# Patient Record
Sex: MALE | Race: AMERICAN INDIAN OR ALASKA NATIVE
[De-identification: names, ages, dates, MRNs, and addresses within clinical notes are randomized per-mention and may not be internally consistent; named-entity substitution may affect disease eponyms.]

---

## 2018-02-19 ENCOUNTER — HOSPITAL ENCOUNTER (INPATIENT)
Dept: HOSPITAL 5 - ED | Age: 48
LOS: 4 days | Discharge: HOME | DRG: 65 | End: 2018-02-23
Attending: INTERNAL MEDICINE | Admitting: INTERNAL MEDICINE
Payer: COMMERCIAL

## 2018-02-19 DIAGNOSIS — I63.9: Primary | ICD-10-CM

## 2018-02-19 DIAGNOSIS — Z82.49: ICD-10-CM

## 2018-02-19 DIAGNOSIS — Z83.3: ICD-10-CM

## 2018-02-19 DIAGNOSIS — J45.909: ICD-10-CM

## 2018-02-19 DIAGNOSIS — R53.1: ICD-10-CM

## 2018-02-19 DIAGNOSIS — I10: ICD-10-CM

## 2018-02-19 DIAGNOSIS — G81.94: ICD-10-CM

## 2018-02-19 DIAGNOSIS — E11.9: ICD-10-CM

## 2018-02-19 DIAGNOSIS — G20: ICD-10-CM

## 2018-02-19 DIAGNOSIS — Z91.013: ICD-10-CM

## 2018-02-19 DIAGNOSIS — E66.01: ICD-10-CM

## 2018-02-19 DIAGNOSIS — Z71.89: ICD-10-CM

## 2018-02-19 LAB
APTT BLD: 31.5 SEC. (ref 24.2–36.6)
BASOPHILS # (AUTO): 0.1 K/MM3 (ref 0–0.1)
BASOPHILS NFR BLD AUTO: 0.9 % (ref 0–1.8)
BILIRUB UR QL STRIP: (no result)
BLOOD UR QL VISUAL: (no result)
BUN SERPL-MCNC: 14 MG/DL (ref 9–20)
BUN/CREAT SERPL: 16 %
CALCIUM SERPL-MCNC: 9.7 MG/DL (ref 8.4–10.2)
EOSINOPHIL # BLD AUTO: 0 K/MM3 (ref 0–0.4)
EOSINOPHIL NFR BLD AUTO: 0.5 % (ref 0–4.3)
HCT VFR BLD CALC: 46 % (ref 35.5–45.6)
HEMOLYSIS INDEX: 12
HGB BLD-MCNC: 15.5 GM/DL (ref 11.8–15.2)
INR PPP: 0.93 (ref 0.87–1.13)
LYMPHOCYTES # BLD AUTO: 1.4 K/MM3 (ref 1.2–5.4)
LYMPHOCYTES NFR BLD AUTO: 24.1 % (ref 13.4–35)
MCH RBC QN AUTO: 28 PG (ref 28–32)
MCHC RBC AUTO-ENTMCNC: 34 % (ref 32–34)
MCV RBC AUTO: 82 FL (ref 84–94)
MONOCYTES # (AUTO): 0.4 K/MM3 (ref 0–0.8)
MONOCYTES % (AUTO): 7.4 % (ref 0–7.3)
MUCOUS THREADS #/AREA URNS HPF: (no result) /HPF
PH UR STRIP: 8 [PH] (ref 5–7)
PLATELET # BLD: 233 K/MM3 (ref 140–440)
RBC # BLD AUTO: 5.62 M/MM3 (ref 3.65–5.03)
RBC #/AREA URNS HPF: 1 /HPF (ref 0–6)
UROBILINOGEN UR-MCNC: < 2 MG/DL (ref ?–2)
WBC #/AREA URNS HPF: 1 /HPF (ref 0–6)

## 2018-02-19 PROCEDURE — 83036 HEMOGLOBIN GLYCOSYLATED A1C: CPT

## 2018-02-19 PROCEDURE — 93325 DOPPLER ECHO COLOR FLOW MAPG: CPT

## 2018-02-19 PROCEDURE — 82962 GLUCOSE BLOOD TEST: CPT

## 2018-02-19 PROCEDURE — 80061 LIPID PANEL: CPT

## 2018-02-19 PROCEDURE — 84439 ASSAY OF FREE THYROXINE: CPT

## 2018-02-19 PROCEDURE — 36415 COLL VENOUS BLD VENIPUNCTURE: CPT

## 2018-02-19 PROCEDURE — 81001 URINALYSIS AUTO W/SCOPE: CPT

## 2018-02-19 PROCEDURE — 93010 ELECTROCARDIOGRAM REPORT: CPT

## 2018-02-19 PROCEDURE — 82652 VIT D 1 25-DIHYDROXY: CPT

## 2018-02-19 PROCEDURE — 71045 X-RAY EXAM CHEST 1 VIEW: CPT

## 2018-02-19 PROCEDURE — 80053 COMPREHEN METABOLIC PANEL: CPT

## 2018-02-19 PROCEDURE — 93312 ECHO TRANSESOPHAGEAL: CPT

## 2018-02-19 PROCEDURE — 93880 EXTRACRANIAL BILAT STUDY: CPT

## 2018-02-19 PROCEDURE — 84484 ASSAY OF TROPONIN QUANT: CPT

## 2018-02-19 PROCEDURE — 85670 THROMBIN TIME PLASMA: CPT

## 2018-02-19 PROCEDURE — 70544 MR ANGIOGRAPHY HEAD W/O DYE: CPT

## 2018-02-19 PROCEDURE — 80048 BASIC METABOLIC PNL TOTAL CA: CPT

## 2018-02-19 PROCEDURE — 85730 THROMBOPLASTIN TIME PARTIAL: CPT

## 2018-02-19 PROCEDURE — 85610 PROTHROMBIN TIME: CPT

## 2018-02-19 PROCEDURE — 84443 ASSAY THYROID STIM HORMONE: CPT

## 2018-02-19 PROCEDURE — 70551 MRI BRAIN STEM W/O DYE: CPT

## 2018-02-19 PROCEDURE — 70450 CT HEAD/BRAIN W/O DYE: CPT

## 2018-02-19 PROCEDURE — 93320 DOPPLER ECHO COMPLETE: CPT

## 2018-02-19 PROCEDURE — 93005 ELECTROCARDIOGRAM TRACING: CPT

## 2018-02-19 PROCEDURE — 85025 COMPLETE CBC W/AUTO DIFF WBC: CPT

## 2018-02-19 PROCEDURE — 82607 VITAMIN B-12: CPT

## 2018-02-19 RX ADMIN — HEPARIN SODIUM SCH UNIT: 5000 INJECTION, SOLUTION INTRAVENOUS; SUBCUTANEOUS at 22:00

## 2018-02-19 RX ADMIN — FAMOTIDINE SCH MG: 10 INJECTION, SOLUTION INTRAVENOUS at 21:27

## 2018-02-19 RX ADMIN — HYDRALAZINE HYDROCHLORIDE SCH MG: 25 TABLET, FILM COATED ORAL at 21:26

## 2018-02-19 RX ADMIN — INSULIN ASPART SCH UNITS: 100 INJECTION, SOLUTION INTRAVENOUS; SUBCUTANEOUS at 22:00

## 2018-02-19 NOTE — XRAY REPORT
AP CHEST:



HISTORY: Cough



AP view of the chest demonstrates a normal mediastinal and

cardiac contour with clear lungs and normal bony and soft tissue

structures.



IMPRESSION:

Unremarkable AP chest. No significant change since 10/29/16.

## 2018-02-19 NOTE — HISTORY AND PHYSICAL REPORT
History of Present Illness


Date of examination: 02/19/18


Date of admission: 


02/19/18 11:57





Chief complaint: 


Chief complaint: Left-sided weakness since morning





History of present illness: 


 History of Present Illness





48-year-old  male with history of hypertension type 2 diabetes and 

hyperlipidemia comes in for left upper and left lower extremity weakness and 

left facial droop and slurred speech.  Patient apparently woke up with these 

symptoms this morning.  His symptoms have been improving.  No syncope no 

seizures.  Patient also has urgency of urine.  No fever no chills.  No 

irregular heart.  No recent travel.  No nausea no vomiting.








 Past Medical History


Previous Medical History?: Yes


Hx Hypertension: Yes


Hx Diabetes: Yes


Hx Asthma: Yes


Additional medical history: martel parkinson syndrome.





Surgical History 


Past Surgical History?: No





-Social History


Smoking Status: Never Smoker


Substance Use Type: None





 Family history:


Hypertension











 Medications   


Home Medications: 


 Home Medications











 Medication  Instructions  Recorded  Confirmed  Last Taken  Type


 


Losartan/Hydrochlorothiazide 1 each PO QDAY 09/10/13 09/11/13 09/30/13 History





[Hyzaar 50-12.5]     


 


amLODIPine [Norvasc] 10 mg PO DAILY 09/10/13 09/11/13 09/30/13 History


 


metFORMIN [Glucophage] 500 mg PO BID 09/10/13 09/11/13 09/30/13 History


 


Carvedilol [Coreg]  10/30/16  Unknown History


 


Ondansetron [Zofran TAB] 4 mg PO Q8HR PRN #14 tablet 10/30/16  Unknown Rx


 


hydrALAZINE [Apresoline TAB]  10/30/16  Unknown History














Review of systems





ROS: 


Stated complaint: LEFT SIDE WEAKNESS


Other details as noted in HPI





Comment: All other systems reviewed and negative


Constitutional: denies: chills, fever


Respiratory: cough.  denies: orthopnea, shortness of breath, SOB with exertion


Cardiovascular: denies: chest pain, palpitations, dyspnea on exertion


Gastrointestinal: denies: abdominal pain, nausea, vomiting, diarrhea, 

constipation, hematemesis, hematochezia


Genitourinary: frequency.  denies: urgency














Medications and Allergies


 Allergies











Allergy/AdvReac Type Severity Reaction Status Date / Time


 


shellfish derived Allergy  Vomiting Verified 09/10/13 22:57











 Home Medications











 Medication  Instructions  Recorded  Confirmed  Last Taken  Type


 


Losartan/Hydrochlorothiazide 1 each PO QDAY 09/10/13 02/19/18 02/19/18 History





[Hyzaar 50-12.5]     


 


amLODIPine [Norvasc] 10 mg PO DAILY 09/10/13 02/19/18 02/19/18 History


 


metFORMIN [Glucophage] 500 mg PO BID 09/10/13 02/19/18 02/19/18 History


 


glipiZIDE [Glipizide] 5 mg PO BID 02/19/18 02/19/18 02/19/18 History


 


hydrALAZINE [Apresoline] 25 mg PO Q8HR 02/19/18 02/19/18 02/19/18 History














Exam





- Physical Exam


Narrative exam: 





Lying in bed comfortably





- Constitutional


Vitals: 


 











Temp Pulse Resp BP Pulse Ox


 


 98.9 F   79   20   186/110   96 


 


 02/19/18 17:28  02/19/18 17:28  02/19/18 17:28  02/19/18 17:28  02/19/18 17:28











General appearance: Present: no acute distress, well-nourished





- EENT


Eyes: Present: PERRL


ENT: hearing intact, clear oral mucosa





- Neck


Neck: Present: supple, normal ROM





- Respiratory


Respiratory effort: normal


Respiratory: bilateral: CTA





- Cardiovascular


Heart rate: 86


Rhythm: regular


Heart Sounds: Present: S1 & S2.  Absent: rub, click





- Extremities


Extremities: pulses symmetrical, No edema


Peripheral Pulses: within normal limits





- Abdominal


General gastrointestinal: Present: soft, non-tender, non-distended, normal 

bowel sounds


Male genitourinary: Present: normal





- Integumentary


Integumentary: Present: clear, warm, dry





- Musculoskeletal


Musculoskeletal: left sided weakness (left side 4/5 power in both upper and 

lower extremities.  Slight left facial droop.)





- Psychiatric


Psychiatric: appropriate mood/affect, intact judgment & insight





- Neurologic


Neurologic: CNII-XII intact, moves all extremities





- Allied Health


Allied health notes reviewed: nursing, case management





Results





- Labs


CBC & Chem 7: 


 02/19/18 09:43





 02/19/18 09:43


Labs: 


 Laboratory Last Values











WBC  5.9 K/mm3 (4.5-11.0)   02/19/18  09:43    


 


RBC  5.62 M/mm3 (3.65-5.03)  H  02/19/18  09:43    


 


Hgb  15.5 gm/dl (11.8-15.2)  H  02/19/18  09:43    


 


Hct  46.0 % (35.5-45.6)  H  02/19/18  09:43    


 


MCV  82 fl (84-94)  L  02/19/18  09:43    


 


MCH  28 pg (28-32)   02/19/18  09:43    


 


MCHC  34 % (32-34)   02/19/18  09:43    


 


RDW  13.9 % (13.2-15.2)   02/19/18  09:43    


 


Plt Count  233 K/mm3 (140-440)   02/19/18  09:43    


 


Lymph % (Auto)  24.1 % (13.4-35.0)   02/19/18  09:43    


 


Mono % (Auto)  7.4 % (0.0-7.3)  H  02/19/18  09:43    


 


Eos % (Auto)  0.5 % (0.0-4.3)   02/19/18  09:43    


 


Baso % (Auto)  0.9 % (0.0-1.8)   02/19/18  09:43    


 


Lymph #  1.4 K/mm3 (1.2-5.4)   02/19/18  09:43    


 


Mono #  0.4 K/mm3 (0.0-0.8)   02/19/18  09:43    


 


Eos #  0.0 K/mm3 (0.0-0.4)   02/19/18  09:43    


 


Baso #  0.1 K/mm3 (0.0-0.1)   02/19/18  09:43    


 


Seg Neutrophils %  67.1 % (40.0-70.0)   02/19/18  09:43    


 


Seg Neutrophils #  4.0 K/mm3 (1.8-7.7)   02/19/18  09:43    


 


PT  12.9 Sec. (12.2-14.9)   02/19/18  09:43    


 


INR  0.93  (0.87-1.13)   02/19/18  09:43    


 


APTT  31.5 Sec. (24.2-36.6)   02/19/18  09:43    


 


Thrombin Time  16.6 Sec. (15.1-19.6)   02/19/18  09:43    


 


Sodium  135 mmol/L (137-145)  L  02/19/18  09:43    


 


Potassium  4.2 mmol/L (3.6-5.0)   02/19/18  09:43    


 


Chloride  95.5 mmol/L ()  L  02/19/18  09:43    


 


Carbon Dioxide  27 mmol/L (22-30)   02/19/18  09:43    


 


Anion Gap  17 mmol/L  02/19/18  09:43    


 


BUN  14 mg/dL (9-20)   02/19/18  09:43    


 


Creatinine  0.9 mg/dL (0.8-1.5)   02/19/18  09:43    


 


Estimated GFR  > 60 ml/min  02/19/18  09:43    


 


BUN/Creatinine Ratio  16 %  02/19/18  09:43    


 


Glucose  280 mg/dL ()  H  02/19/18  09:43    


 


POC Glucose  255  ()  H  02/19/18  10:42    


 


Calcium  9.7 mg/dL (8.4-10.2)   02/19/18  09:43    


 


Troponin T  < 0.010 ng/mL (0.00-0.029)   02/19/18  09:43    


 


Urine Color  Straw  (Yellow)   02/19/18  11:19    


 


Urine Turbidity  Clear  (Clear)   02/19/18  11:19    


 


Urine pH  8.0  (5.0-7.0)  H  02/19/18  11:19    


 


Ur Specific Gravity  1.014  (1.003-1.030)   02/19/18  11:19    


 


Urine Protein  30 mg/dl mg/dL (Negative)   02/19/18  11:19    


 


Urine Glucose (UA)  >=500 mg/dL (Negative)   02/19/18  11:19    


 


Urine Ketones  Neg mg/dL (Negative)   02/19/18  11:19    


 


Urine Blood  Neg  (Negative)   02/19/18  11:19    


 


Urine Nitrite  Neg  (Negative)   02/19/18  11:19    


 


Urine Bilirubin  Neg  (Negative)   02/19/18  11:19    


 


Urine Urobilinogen  < 2.0 mg/dL (<2.0)   02/19/18  11:19    


 


Ur Leukocyte Esterase  Neg  (Negative)   02/19/18  11:19    


 


Urine WBC (Auto)  1.0 /HPF (0.0-6.0)   02/19/18  11:19    


 


Urine RBC (Auto)  1.0 /HPF (0.0-6.0)   02/19/18  11:19    


 


Urine Mucus  Few /HPF  02/19/18  11:19    








 Short CBC











  02/19/18 Range/Units





  09:43 


 


WBC  5.9  (4.5-11.0)  K/mm3


 


Hgb  15.5 H  (11.8-15.2)  gm/dl


 


Hct  46.0 H  (35.5-45.6)  %


 


Plt Count  233  (140-440)  K/mm3








 BMP











  02/19/18





  09:43


 


Sodium  135 L


 


Potassium  4.2


 


Chloride  95.5 L


 


Carbon Dioxide  27


 


BUN  14


 


Creatinine  0.9


 


Glucose  280 H


 


Calcium  9.7








 Cardiac Enzymes











  02/19/18 Range/Units





  09:43 


 


Troponin T  < 0.010  (0.00-0.029)  ng/mL








 Urine











  02/19/18 Range/Units





  11:19 


 


Urine Color  Straw  (Yellow)  


 


Urine pH  8.0 H  (5.0-7.0)  


 


Ur Specific Gravity  1.014  (1.003-1.030)  


 


Urine Protein  30 mg/dl  (Negative)  mg/dL


 


Urine Glucose (UA)  >=500  (Negative)  mg/dL














- Imaging and Cardiology


EKG: report reviewed (normal sinus rhythm 86/m left atrial enlargement and 

borderline ST elevation in anterior leads.)





Assessment and Plan


Advance Directives: Yes (full code)


VTE prophylaxis?: Chemical


Plan of care discussed with patient/family: Yes





- Patient Problems


(1) TIA (transient ischemic attack)


Current Visit: Yes   Status: Acute   


Qualifiers: 


   Transient cerebral ischemia type: carotid artery syndrome (hemispheric)   

Qualified Code(s): G45.1 - Carotid artery syndrome (hemispheric)   


Plan to address problem: 


Transient ischemic attack.  Patient improving rapidly.  Can deteriorate into 

acute CVA.  Patient started on Plavix 75 mg once a day.


Patient also to get echocardiogram MRI MRA and carotid duplex scan.


Neurology consult requested.








(2) Hypertension


Current Visit: Yes   Status: Chronic   


Qualifiers: 


   Hypertension type: essential hypertension   Qualified Code(s): I10 - 

Essential (primary) hypertension   


Plan to address problem: 


Continue losartan and amlodipine and carvedilol and hydralazine.


Keep blood pressure under control.








(3) T2DM (type 2 diabetes mellitus)


Current Visit: Yes   Status: Chronic   


Qualifiers: 


   Diabetes mellitus complication status: without complication   Diabetes 

mellitus long term insulin use: without long term use   Qualified Code(s): 

E11.9 - Type 2 diabetes mellitus without complications   


Plan to address problem: 


Continue metformin and coverage


Check A1c


Accu-Cheks before meals and at bedtime and moderate dose sliding scale coverage








(4) Hyperlipidemia


Current Visit: Yes   Status: Chronic   


Qualifiers: 


   Hyperlipidemia type: mixed hyperlipidemia   Qualified Code(s): E78.2 - Mixed 

hyperlipidemia   


Plan to address problem: 


Patient started on statins Lipitor 10 mg daily








(5) DVT prophylaxis


Current Visit: Yes   Status: Acute   


Plan to address problem: 


On heparin 5000 every 12

## 2018-02-19 NOTE — CAT SCAN REPORT
CT HEAD WITHOUT CONTRAST:



HISTORY:  Left sided weakness.



TECHNIQUE:  Sequential 2.5mm CT images.



COMPARISON: none.



FINDINGS:



Cerebral Parenchyma: Minimal nonspecific chronic white matter changes 

are identified in both frontal lobes. A chronic appearing 7 mm lacunar 

infarct is identified in the right zak. The remainder of the brain 

parenchyma is within normal limits. No evidence for mass, hemorrhage or 

extra-axial fluid collection.



Cerebellum:  Within normal limits.



Brainstem:  Within normal limits.



Ventricles: Normal.



Sella:  Normal.



Extra-axial spaces:  Normal.



Basal Cisterns:  Normal.



Midline Shift:  None.



Calvarium: Normal.



Sinuses: Normal.



Mastoid Air Cells:  Normal.



Visualized Orbits:  Normal.







IMPRESSION:

Mild chronic white matter changes. Chronic lacunar infarct in the right 

zak. No acute intracranial process is appreciated on noncontrast CT.

## 2018-02-19 NOTE — EMERGENCY DEPARTMENT REPORT
ED Neuro Deficit HPI





- General


Chief Complaint: Neuro Symptoms/Deficit


Stated Complaint: LEFT SIDE WEAKNESS


Time Seen by Provider: 18 10:32


Source: patient


Mode of arrival: Ambulatory


Limitations: No Limitations





- History of Present Illness


Initial Comments: 





Patient is a 48-year-old male history of hypertension diabetes brought by EMS 

with a chief complaint of left upper and lower extremity weakness, left facial 

droop and slurred speech.  Patient stated that he woke up with this symptom 

this morning.  He feel his symptoms improving.  Patient also stated that he is 

feeling like he is unable to control his bladder.  Patient denied any fever or 

stiff neck.


Location: speech, left arm, left leg


Presenting Symptoms: Present: Weak/Paralyzed One Side, Unable to Speak Clearly


History of same: No


Place: home


Quality: improving





- Related Data


Home Medications: 


 Home Medications











 Medication  Instructions  Recorded  Confirmed  Last Taken


 


Losartan/Hydrochlorothiazide 1 each PO QDAY 09/10/13 09/11/13 09/30/13





[Hyzaar 50-12.5]    


 


amLODIPine [Norvasc] 10 mg PO DAILY 09/10/13 09/11/13 09/30/13


 


metFORMIN [Glucophage] 500 mg PO BID 09/10/13 09/11/13 09/30/13


 


Carvedilol [Coreg]  10/30/16  Unknown


 


hydrALAZINE [Apresoline TAB]  10/30/16  Unknown








 Previous Rx's











 Medication  Instructions  Recorded  Last Taken  Type


 


Ondansetron [Zofran TAB] 4 mg PO Q8HR PRN #14 tablet 10/30/16 Unknown Rx











Allergies/Adverse Reactions: 


 Allergies











Allergy/AdvReac Type Severity Reaction Status Date / Time


 


shellfish derived Allergy  Vomiting Verified 09/10/13 22:57














ED Review of Systems


ROS: 


Stated complaint: LEFT SIDE WEAKNESS


Other details as noted in HPI





Comment: All other systems reviewed and negative


Constitutional: denies: chills, fever


Respiratory: cough.  denies: orthopnea, shortness of breath, SOB with exertion


Cardiovascular: denies: chest pain, palpitations, dyspnea on exertion


Gastrointestinal: denies: abdominal pain, nausea, vomiting, diarrhea, 

constipation, hematemesis, hematochezia


Genitourinary: frequency.  denies: urgency





ED Past Medical Hx





- Past Medical History


Previous Medical History?: Yes


Hx Hypertension: Yes


Hx Diabetes: Yes


Hx Asthma: Yes


Additional medical history: martel parkinson





- Surgical History


Past Surgical History?: No





- Social History


Smoking Status: Never Smoker


Substance Use Type: None





- Medications


Home Medications: 


 Home Medications











 Medication  Instructions  Recorded  Confirmed  Last Taken  Type


 


Losartan/Hydrochlorothiazide 1 each PO QDAY 09/10/13 09/11/13 09/30/13 History





[Hyzaar 50-12.5]     


 


amLODIPine [Norvasc] 10 mg PO DAILY 09/10/13 09/11/13 09/30/13 History


 


metFORMIN [Glucophage] 500 mg PO BID 09/10/13 09/11/13 09/30/13 History


 


Carvedilol [Coreg]  10/30/16  Unknown History


 


Ondansetron [Zofran TAB] 4 mg PO Q8HR PRN #14 tablet 10/30/16  Unknown Rx


 


hydrALAZINE [Apresoline TAB]  10/30/16  Unknown History














ED Neuro Physical Exam





- General


Limitations: No Limitations


General appearance: alert, in no apparent distress


Suspected Stroke: Yes





- Head


Head exam: Present: atraumatic, normocephalic, normal inspection





- Eye


Eye exam: Present: normal appearance, PERRL





- ENT


ENT exam: Present: normal exam, normal orophraynx, mucous membranes moist





- Neck


Neck exam: Present: normal inspection, full ROM.  Absent: tenderness, 

meningismus, lymphadenopathy, thyromegaly





- Respiratory


Respiratory exam: Present: normal lung sounds bilaterally.  Absent: respiratory 

distress, wheezes, chest wall tenderness





- Cardiovascular


Cardiovascular Exam: Present: regular rate, normal rhythm, normal heart sounds





- GI/Abdominal


GI/Abdominal exam: Present: soft, normal bowel sounds.  Absent: distended, 

tenderness, guarding, rebound, rigid, organomegaly, mass, bruit, pulsatile mass

, hernia





- Back Exam


Back exam: Present: normal inspection, full ROM.  Absent: CVA tenderness (R), 

CVA tenderness (L), muscle spasm





- Neurological Exam


Neurological exam: Present: alert, oriented X3, CN II-XII intact





- NIHSS


Assessment Interval: Baseline


1a. Level of Consciousness: alert


1b. LOC Questions: answers correctly


1c. LOC Commands: performs tasks correctly


2. Best Gaze: normal


3. Visual: no visual loss


4. Facial Palsy: normal symmetrical movement


5b. Motor Arm Right: no drift


5a. Motor Arm Left: no drift


6a. Motor Leg Left: no drift


6b. Motor Leg Right: no drift


7. Limb Ataxia: absent


8. Sensory: normal


9. Best Language: no aphasia


10. Dysarthria: normal


11. Extinction/Inattention: no abnormality


Total Score: 0


Stroke Severity: No Stroke Symptoms





- Psychiatric


Psychiatric exam: Present: normal affect





- Skin


Skin exam: Present: warm, intact, normal color.  Absent: cyanosis, diaphoretic





ED Course


 Vital Signs











  18





  09:14 10:33 10:40


 


Temperature 98.2 F 100.6 F H 


 


Pulse Rate 87 81 80


 


Respiratory 18 19 





Rate   


 


Blood Pressure 185/104  


 


Blood Pressure  161/99 





[Left]   


 


O2 Sat by Pulse 98 100 





Oximetry   














  18





  11:43


 


Temperature 


 


Pulse Rate 82


 


Respiratory 25 H





Rate 


 


Blood Pressure 


 


Blood Pressure 168/95





[Left] 


 


O2 Sat by Pulse 99





Oximetry 














- Lab Data


Result diagrams: 


 18 09:43





 18 09:43


 Lab Results











  18 Range/Units





  09:24 09:43 09:43 


 


WBC   5.9   (4.5-11.0)  K/mm3


 


RBC   5.62 H   (3.65-5.03)  M/mm3


 


Hgb   15.5 H   (11.8-15.2)  gm/dl


 


Hct   46.0 H   (35.5-45.6)  %


 


MCV   82 L   (84-94)  fl


 


MCH   28   (28-32)  pg


 


MCHC   34   (32-34)  %


 


RDW   13.9   (13.2-15.2)  %


 


Plt Count   233   (140-440)  K/mm3


 


Lymph % (Auto)   24.1   (13.4-35.0)  %


 


Mono % (Auto)   7.4 H   (0.0-7.3)  %


 


Eos % (Auto)   0.5   (0.0-4.3)  %


 


Baso % (Auto)   0.9   (0.0-1.8)  %


 


Lymph #   1.4   (1.2-5.4)  K/mm3


 


Mono #   0.4   (0.0-0.8)  K/mm3


 


Eos #   0.0   (0.0-0.4)  K/mm3


 


Baso #   0.1   (0.0-0.1)  K/mm3


 


Seg Neutrophils %   67.1   (40.0-70.0)  %


 


Seg Neutrophils #   4.0   (1.8-7.7)  K/mm3


 


PT    12.9  (12.2-14.9)  Sec.


 


INR    0.93  (0.87-1.13)  


 


APTT    31.5  (24.2-36.6)  Sec.


 


Thrombin Time     (15.1-19.6)  Sec.


 


Sodium     (137-145)  mmol/L


 


Potassium     (3.6-5.0)  mmol/L


 


Chloride     ()  mmol/L


 


Carbon Dioxide     (22-30)  mmol/L


 


Anion Gap     mmol/L


 


BUN     (9-20)  mg/dL


 


Creatinine     (0.8-1.5)  mg/dL


 


Estimated GFR     ml/min


 


BUN/Creatinine Ratio     %


 


Glucose     ()  mg/dL


 


POC Glucose  250 H    ()  


 


Calcium     (8.4-10.2)  mg/dL


 


Troponin T     (0.00-0.029)  ng/mL














  18 Range/Units





  09:43 09:43 10:42 


 


WBC     (4.5-11.0)  K/mm3


 


RBC     (3.65-5.03)  M/mm3


 


Hgb     (11.8-15.2)  gm/dl


 


Hct     (35.5-45.6)  %


 


MCV     (84-94)  fl


 


MCH     (28-32)  pg


 


MCHC     (32-34)  %


 


RDW     (13.2-15.2)  %


 


Plt Count     (140-440)  K/mm3


 


Lymph % (Auto)     (13.4-35.0)  %


 


Mono % (Auto)     (0.0-7.3)  %


 


Eos % (Auto)     (0.0-4.3)  %


 


Baso % (Auto)     (0.0-1.8)  %


 


Lymph #     (1.2-5.4)  K/mm3


 


Mono #     (0.0-0.8)  K/mm3


 


Eos #     (0.0-0.4)  K/mm3


 


Baso #     (0.0-0.1)  K/mm3


 


Seg Neutrophils %     (40.0-70.0)  %


 


Seg Neutrophils #     (1.8-7.7)  K/mm3


 


PT     (12.2-14.9)  Sec.


 


INR     (0.87-1.13)  


 


APTT     (24.2-36.6)  Sec.


 


Thrombin Time   16.6   (15.1-19.6)  Sec.


 


Sodium  135 L    (137-145)  mmol/L


 


Potassium  4.2    (3.6-5.0)  mmol/L


 


Chloride  95.5 L    ()  mmol/L


 


Carbon Dioxide  27    (22-30)  mmol/L


 


Anion Gap  17    mmol/L


 


BUN  14    (9-20)  mg/dL


 


Creatinine  0.9    (0.8-1.5)  mg/dL


 


Estimated GFR  > 60    ml/min


 


BUN/Creatinine Ratio  16    %


 


Glucose  280 H    ()  mg/dL


 


POC Glucose    255 H  ()  


 


Calcium  9.7    (8.4-10.2)  mg/dL


 


Troponin T  < 0.010    (0.00-0.029)  ng/mL














- EKG Data


-: EKG Interpreted by Me


EKG shows normal: sinus rhythm


Rate: normal


Interpretation: no acute changes





- Radiology Data


Radiology results: report reviewed


Referring Physician:   KATHIE LYONS


Patient Name:   ARNALDO RESENDIZ


Patient ID:   [id number]


YOB: 1970


Sex:   Male


Accession:   [id number]


Report Date:   2018


Report Status:   Finalized


Findings


[facility] 


[address] [place] [address] 





Cat Scan Report 


Signed 





Patient: ARNALDO RESENDIZ MR#: [id number] 


: 1970 Acct:[id number] 


Age/Sex: 48 / M ADM Date: 18 


Loc: ED 


Attending Dr: 








Ordering Physician: KATHIE LYONS MD 


Date of Service: 18 


Procedure(s): CT head/brain wo con 


Accession Number(s): [id number] 





cc: KATHIE LYONS MD 








CT HEAD WITHOUT CONTRAST: 





HISTORY: Left sided weakness. 





TECHNIQUE: Sequential 2.5mm CT images. 





COMPARISON: none. 





FINDINGS: 





Cerebral Parenchyma: Minimal nonspecific chronic white matter changes 


are identified in both frontal lobes. A chronic appearing 7 mm lacunar 


infarct is identified in the right zak. The remainder of the brain 


parenchyma is within normal limits. No evidence for mass, hemorrhage or 


extra-axial fluid collection. 





Cerebellum: Within normal limits. 





Brainstem: Within normal limits. 





Ventricles: Normal. 





Sella: Normal. 





Extra-axial spaces: Normal. 





Basal Cisterns: Normal. 





Midline Shift: None. 





Calvarium: Normal. 





Sinuses: Normal. 





Mastoid Air Cells: Normal. 





Visualized Orbits: Normal. 











IMPRESSION: 


Mild chronic white matter changes. Chronic lacunar infarct in the right 


zak. No acute intracranial process is appreciated on noncontrast CT. 





Transcribed By: TTR 


Dictated By: DAV CARRANZA JR, MD 


Electronically Authenticated By: DAV CARRANZA JR, MD 


Signed Date/Time: 18 











DD/DT: 18 


TD/TT: 18





- Medical Decision Making





Discussed with Dr. Richard, I presented the patient to him, he agreed to admit the 

patient to the service.


Critical care attestation.: 


If time is entered above; I have spent that time in minutes in the direct care 

of this critically ill patient, excluding procedure time.








ED Disposition


Clinical Impression: 


 TIA (transient ischemic attack)





Disposition:  OP ADMIT IP TO THIS HOSP


Is pt being admited?: Yes


Condition: Stable


Referrals: 


PRIMARY CARE,MD [Primary Care Provider] - 3-5 Days

## 2018-02-20 LAB
ALBUMIN SERPL-MCNC: 3.7 G/DL (ref 3.9–5)
ALT SERPL-CCNC: 12 UNITS/L (ref 7–56)
BASOPHILS # (AUTO): 0.1 K/MM3 (ref 0–0.1)
BASOPHILS NFR BLD AUTO: 0.9 % (ref 0–1.8)
BUN SERPL-MCNC: 19 MG/DL (ref 9–20)
BUN/CREAT SERPL: 19 %
CALCIUM SERPL-MCNC: 9.1 MG/DL (ref 8.4–10.2)
EOSINOPHIL # BLD AUTO: 0.1 K/MM3 (ref 0–0.4)
EOSINOPHIL NFR BLD AUTO: 0.9 % (ref 0–4.3)
HCT VFR BLD CALC: 42.7 % (ref 35.5–45.6)
HDLC SERPL-MCNC: 48 MG/DL (ref 40–59)
HEMOLYSIS INDEX: 14
HGB BLD-MCNC: 14.3 GM/DL (ref 11.8–15.2)
LYMPHOCYTES # BLD AUTO: 1.8 K/MM3 (ref 1.2–5.4)
LYMPHOCYTES NFR BLD AUTO: 30.6 % (ref 13.4–35)
MCH RBC QN AUTO: 28 PG (ref 28–32)
MCHC RBC AUTO-ENTMCNC: 34 % (ref 32–34)
MCV RBC AUTO: 83 FL (ref 84–94)
MONOCYTES # (AUTO): 0.5 K/MM3 (ref 0–0.8)
MONOCYTES % (AUTO): 8 % (ref 0–7.3)
PLATELET # BLD: 247 K/MM3 (ref 140–440)
RBC # BLD AUTO: 5.15 M/MM3 (ref 3.65–5.03)
T4 FREE SERPL-MCNC: 1.39 NG/DL (ref 0.76–1.46)

## 2018-02-20 RX ADMIN — METFORMIN HYDROCHLORIDE SCH MG: 500 TABLET ORAL at 17:11

## 2018-02-20 RX ADMIN — INSULIN ASPART SCH UNITS: 100 INJECTION, SOLUTION INTRAVENOUS; SUBCUTANEOUS at 17:24

## 2018-02-20 RX ADMIN — AMLODIPINE BESYLATE SCH MG: 10 TABLET ORAL at 09:16

## 2018-02-20 RX ADMIN — CLOPIDOGREL BISULFATE SCH MG: 75 TABLET ORAL at 09:17

## 2018-02-20 RX ADMIN — INSULIN ASPART SCH UNITS: 100 INJECTION, SOLUTION INTRAVENOUS; SUBCUTANEOUS at 08:35

## 2018-02-20 RX ADMIN — METFORMIN HYDROCHLORIDE SCH MG: 500 TABLET ORAL at 01:57

## 2018-02-20 RX ADMIN — INSULIN ASPART SCH UNITS: 100 INJECTION, SOLUTION INTRAVENOUS; SUBCUTANEOUS at 22:44

## 2018-02-20 RX ADMIN — HYDRALAZINE HYDROCHLORIDE SCH MG: 25 TABLET, FILM COATED ORAL at 13:25

## 2018-02-20 RX ADMIN — GLIMEPIRIDE SCH MG: 2 TABLET ORAL at 09:18

## 2018-02-20 RX ADMIN — HYDRALAZINE HYDROCHLORIDE SCH: 25 TABLET, FILM COATED ORAL at 06:05

## 2018-02-20 RX ADMIN — HYDROCHLOROTHIAZIDE SCH MG: 12.5 CAPSULE ORAL at 09:19

## 2018-02-20 RX ADMIN — METFORMIN HYDROCHLORIDE SCH MG: 500 TABLET ORAL at 09:18

## 2018-02-20 RX ADMIN — FAMOTIDINE SCH MG: 20 TABLET ORAL at 22:44

## 2018-02-20 RX ADMIN — HEPARIN SODIUM SCH UNIT: 5000 INJECTION, SOLUTION INTRAVENOUS; SUBCUTANEOUS at 22:45

## 2018-02-20 RX ADMIN — HEPARIN SODIUM SCH UNIT: 5000 INJECTION, SOLUTION INTRAVENOUS; SUBCUTANEOUS at 09:33

## 2018-02-20 RX ADMIN — FAMOTIDINE SCH MG: 10 INJECTION, SOLUTION INTRAVENOUS at 09:18

## 2018-02-20 RX ADMIN — LOSARTAN POTASSIUM SCH MG: 50 TABLET, FILM COATED ORAL at 09:17

## 2018-02-20 RX ADMIN — SODIUM CHLORIDE SCH MLS/HR: 0.9 INJECTION, SOLUTION INTRAVENOUS at 22:43

## 2018-02-20 RX ADMIN — INSULIN ASPART SCH UNITS: 100 INJECTION, SOLUTION INTRAVENOUS; SUBCUTANEOUS at 12:08

## 2018-02-20 RX ADMIN — HYDRALAZINE HYDROCHLORIDE SCH MG: 25 TABLET, FILM COATED ORAL at 22:44

## 2018-02-20 RX ADMIN — CLOPIDOGREL BISULFATE SCH MG: 75 TABLET ORAL at 02:02

## 2018-02-20 NOTE — MAGNETIC RESONANCE REPORT
MRI BRAIN WITHOUT CONTRAST: 02/20/18



CLINICAL: Stroke.



COMPARISON: CT Head 02/19/18



TECHNIQUE: Axial diffusion, T1, T2, FLAIR, gradient echo T2*, and 

sagittal T1 sequences on a 1.5 Yanet magnet.  



FINDINGS: The ventricles and sulci are normal for age.  Cavum septum 

pellucidum and cavum vergae variants of ventricular anatomy. Focal 

restricted diffusion in the right zak correlates with a hypodense 

lacunar infarct identified on CT.  It is hyperintense on T2 and FLAIR 

and is hypointense but very subtle on T1.  It measures 11 mm maximum.  

No other restricted diffusion.  Extensive bilateral tiny multifocal 

subcortical and periventricular white matter hyperintensities FLAIR and 

T2.  No mass or mass effect.  No hemorrhage, edema or extra-axial 

collection.  Normal pituitary and optic chiasm.  The cerebellum is 

normal.  Intact vascular flow voids.  Normal sinuses. The orbits,  and 

soft tissues are normal.  Normal calvarium and skull base.



IMPRESSION: 1.  A subacute nonhemorrhagic right pontine lacunar 

infarct.  2.  No other infarct.  3.  No hemorrhage.  4.  Moderate 

bilateral chronic white matter microangiopathy.

## 2018-02-20 NOTE — CONSULTATION
History of Present Illness


Consult date: 18


Requesting physician: NAUN GOOD


Reason for Consult: TIA/Stroke


History of present illness: 





This 48 year old right handed male with history of hypertension, diabetes, and 

hyperlipidemia presented to the ER on 18, with complaint of awakening with 

left sided weakness and slurred speech.  The patient felt that on  his 

left leg was a bit weak, but he attributed it to chronic hip pain that he has 

been dealing with.  When he awakened in the a.m., he noted weakness in the left 

arm as well and wife noted that his speech was slurred and face asymmetric with 

left facial droop.  The patient has had no such events in the past.  He had not 

been taking aspirin regularly.   


During the event on 18, he denied diplopia, diaphoresis, nausea, numbness 

except in distal left fingertips.  He also noted headache on .  There was 

no chest pain, palpitations, dizziness.  





Past History


Past Medical History: diabetes, hypertension, hyperlipidemia


Past Surgical History: No surgical history


Social history: , lives with family, other (Works as [employer] 

.).  denies: smoking, alcohol abuse, prescription drug abuse, IV drug use


Family history: diabetes, hypertension, stroke (Father  of strokes in his 70

's.  Mother alive with diabetes and hypertension.  Syblings with diabetes and 

hypertension.), other (hyperlipidemia)





Medications and Allergies


 Allergies











Allergy/AdvReac Type Severity Reaction Status Date / Time


 


shellfish derived Allergy  Vomiting Verified 09/10/13 22:57











 Home Medications











 Medication  Instructions  Recorded  Confirmed  Last Taken  Type


 


Losartan/Hydrochlorothiazide 1 each PO QDAY 09/10/13 02/19/18 02/19/18 History





[Hyzaar 50-12.5]     


 


amLODIPine [Norvasc] 10 mg PO DAILY 09/10/13 02/19/18 02/19/18 History


 


metFORMIN [Glucophage] 500 mg PO BID 09/10/13 02/19/18 02/19/18 History


 


glipiZIDE [Glipizide] 5 mg PO BID 18 History


 


hydrALAZINE [Apresoline] 25 mg PO Q8HR 18 History











Active Meds: 


Active Medications





Acetaminophen (Tylenol)  650 mg PO Q4H PRN


   PRN Reason: Pain MILD(1-3)/Fever >100.5/HA


Amlodipine Besylate (Norvasc)  10 mg PO DAILY [facility]


   Last Admin: 18 09:16 Dose:  10 mg


Atorvastatin Calcium (Lipitor)  40 mg PO QHS [facility]


Bisacodyl (Dulcolax)  10 mg FL QDAY PRN


   PRN Reason: Constipation unrelieved by MOM


Clopidogrel Bisulfate (Plavix)  75 mg PO QDAY [facility]


   Last Admin: 18 09:17 Dose:  75 mg


Famotidine (Pepcid)  20 mg IV BID [facility]


   Last Admin: 18 09:18 Dose:  20 mg


Glimepiride (Amaryl)  2 mg PO QDDIAB [facility]


   Last Admin: 18 09:18 Dose:  2 mg


Heparin Sodium (Porcine) (Heparin)  5,000 unit SUB-Q Q12HR [facility]


   Last Admin: 18 09:33 Dose:  5,000 unit


Hydralazine HCl (Apresoline)  25 mg PO Q8HR [facility]


   Last Admin: 18 06:05 Dose:  Not Given


Hydralazine HCl (Apresoline)  5 mg IV Q6H PRN


   PRN Reason: Keep SBP between 160-185 mm Hg


Hydrochlorothiazide (Hctz)  12.5 mg PO QDAY [facility]


   Last Admin: 18 09:19 Dose:  12.5 mg


Sodium Chloride (Nacl 0.9% 1000 Ml)  1,000 mls @ 75 mls/hr IV AS DIRECT [facility]


Insulin Aspart (Novolog)  0 units SUB-Q ACHS [facility]


   PRN Reason: Protocol


   Last Admin: 18 08:35 Dose:  4 units


Losartan Potassium (Cozaar)  50 mg PO QDAY [facility]


   Last Admin: 18 09:17 Dose:  50 mg


Magnesium Hydroxide (Milk Of Magnesia)  30 ml PO Q4H PRN


   PRN Reason: Constipation


Metformin HCl (Glucophage)  500 mg PO BIDDIAB [facility]


   Last Admin: 18 09:18 Dose:  500 mg


Morphine Sulfate (Morphine)  2 mg IV Q4H PRN


   PRN Reason: Pain, Moderate (4-6)


Ondansetron HCl (Zofran)  4 mg IV Q8H PRN


   PRN Reason: N/V unrelieved by Reglan


Oxycodone/Acetaminophen (Percocet 5/325)  1 tab PO Q6H PRN


   PRN Reason: Pain, Moderate (4-6)


Sodium Chloride (Sodium Chloride Flush Syringe 10 Ml)  10 ml IV PRN PRN


   PRN Reason: LINE FLUSH











Review of Systems


Constitutional: weight loss, weakness, other (intentional weight loss diet.), 

no fever, no sweats, no fatigue, no chronic headaches


Ears, nose, mouth and throat: headache, no tinnitis, no decreased hearing, no 

nasal congestion, no dysphagia, no hoarseness


Cardiovascular: no chest pain, no orthopnea, no palpitations, no rapid/

irregular heart beat, no edema, no syncope, no lightheadedness, no shortness of 

breath, no dyspnea on exertion


Respiratory: no cough, no shortness of breath, no dyspnea on exertion, no 

congestion


Gastrointestinal: no abdominal pain, no nausea, no vomiting, no diarrhea, no 

constipation, no change in bowel habits


Genitourinary Male: urinary frequency, no dysuria, no urinary hesitancy


Musculoskeletal: gait dysfunction, no neck stiffness, no neck pain


Integumentary: no rash, no pruritis


Neurological: weakness, lack of coordination, headaches, change in speech, gait 

dysfunction, no parathesias, no numbness, no tingling, no seizures, no syncope, 

no tremors, no vertigo, no confusion, no sensory deficit, no double vision, no 

loss of vision, no hearing difficulties





Physical Examination





- Vital Signs


Vital Signs: 


 Vital Signs











Temp Pulse Resp BP Pulse Ox


 


 98.2 F   87   18   185/104   98 


 


 18 09:14  18 09:14  18 09:14  18 09:14  18 09:14














- Constitutional


General appearance: comfortable





- EENT


EENT: Present: PERRL, mucous membranes moist, hearing intact, vision intact





- Respiratory


Respiratory: Present: lungs clear, normal breath sounds, no respiratory distress





- Cardiovascular


Cardiovascular: Present: regular rate, normal S1, normal S2, no murmurs


Extremities: Present: no peripheral edema bilatateraly, no clubbing, cyanosis, 

no inflammation





- Gastrointestinal


Gastrointestinal: Present: normoactive bowel sounds, soft, non-tender





- Integumentary


Integumentary: Present: normal





- Neurologic


Cranial nerve examination: PERRL, EOMI, VFF, V1/V2/V3 grossly intact, tongue 

midline, intact shoulder shrug, facial droop


Speech examination: intact, other (slight slurring)


Motor examination - right side: 5/5: biceps, triceps, wrist flexion, wrist 

extension, hip flexors, knee extensors, dorsiflexion, plantarflexion


Motor examination - left side: 4/5: biceps, triceps, wrist flexion, wrist 

extension, hip flexors, knee extensors, dorsiflexion, plantarflexion


Detailed sensory examination: intact, light touch, pain


Reflex and gait examination: other (slightly drags left leg)


Reflexes: 1+: ankle, bicep, knee


Cerebellar examination: other (FTN, KAVON, fine finger movements intact.)





Results





- Laboratory Findings


CBC and BMP: 


 18 05:51





 18 05:51


Abnormal Lab Findings: 


 Abnormal Labs











  18





  09:24 09:43 09:43


 


RBC   5.62 H 


 


Hgb   15.5 H 


 


Hct   46.0 H 


 


MCV   82 L 


 


Mono % (Auto)   7.4 H 


 


Sodium    135 L


 


Chloride    95.5 L


 


Glucose    280 H


 


POC Glucose  250 H  


 


Hemoglobin A1c   


 


Albumin   


 


Triglycerides   


 


Cholesterol   


 


LDL Cholesterol Direct   


 


Urine pH   














  18





  10:42 11:19 20:04


 


RBC   


 


Hgb   


 


Hct   


 


MCV   


 


Mono % (Auto)   


 


Sodium   


 


Chloride   


 


Glucose   


 


POC Glucose  255 H   243 H


 


Hemoglobin A1c   


 


Albumin   


 


Triglycerides   


 


Cholesterol   


 


LDL Cholesterol Direct   


 


Urine pH   8.0 H 














  18





  20:59 05:51 05:51


 


RBC   5.15 H 


 


Hgb   


 


Hct   


 


MCV   83 L 


 


Mono % (Auto)   8.0 H 


 


Sodium    134 L


 


Chloride   


 


Glucose    307 H


 


POC Glucose   


 


Hemoglobin A1c  11.0 H  


 


Albumin    3.7 L


 


Triglycerides    204 H


 


Cholesterol    247 H


 


LDL Cholesterol Direct    159 H


 


Urine pH   














  18





  08:22 12:05


 


RBC  


 


Hgb  


 


Hct  


 


MCV  


 


Mono % (Auto)  


 


Sodium  


 


Chloride  


 


Glucose  


 


POC Glucose  277 H  237 H


 


Hemoglobin A1c  


 


Albumin  


 


Triglycerides  


 


Cholesterol  


 


LDL Cholesterol Direct  


 


Urine pH  








MRI scan - reveals subacute lacune in rt. zak.


MRA without abnormality.  Echocardiogram pending.


Carotid ultrasound pending.





Assessment and Plan





48 year old male awakened with left sided weakness on 18.  Subacute 

pontine infarcton noted on MRI.    Also diffuse white matter disease.  He is 

very young to have this level of vascular disease.  Started on aspirin and 

plavix.





Plan - check echocardiogram and carotid dopplers 


   Thyroid panel, B-12 level.

## 2018-02-20 NOTE — PROGRESS NOTE
Assessment and Plan


Assessment and plan: 


--Acute CVA; not a candidate for TPA


Aspirin and statin


Neuro workup is in progress


Follow MRI/MRA carotid Doppler echocardiogram, neurology evaluation


Physical therapy occupational therapy rehabilitation





--Type 2 diabetes mellitus; Accu-Chek sliding scale coverage and ADA diet and 

insulin as needed





--Hypertension; moderate control, permissive hypertension per stroke protocol


When necessary hydralazine





--Dyslipidemia; placed on statin, low cholesterol diet





--DVT prophylaxis; Lovenox





Follow neuro evaluation


Physical therapy occupational therapy


Will follow neuro workup and adjust the management as needed


Plan of care is reviewed with the patient and his nurse











History


Interval history: 


Patient was admitted with left-sided weakness


Neuro workup is in progress


The patient feels slightly better


Awake oriented 3 not in acute distress


Vital signs reviewed








Hospitalist Physical





- Constitutional


Vitals: 


 











Temp Pulse Resp BP Pulse Ox


 


 97.3 F L  79   18   173/109   98 


 


 02/20/18 12:00  02/20/18 13:25  02/20/18 12:00  02/20/18 13:25  02/20/18 12:00











General appearance: Present: no acute distress, well-nourished





- EENT


Eyes: Present: PERRL, EOM intact





- Neck


Neck: Present: supple, normal ROM





- Respiratory


Respiratory effort: normal


Respiratory: bilateral: diminished, negative: rales, rhonchi, wheezing





- Cardiovascular


Rhythm: regular


Heart Sounds: Present: S1 & S2





- Extremities


Extremities: no ischemia, No edema





- Abdominal


General gastrointestinal: soft, non-tender, non-distended, normal bowel sounds





- Integumentary


Integumentary: Present: clear, warm





- Psychiatric


Psychiatric: appropriate mood/affect, cooperative





- Neurologic


Neurologic: other (left-sided weakness )





Results





- Labs


CBC & Chem 7: 


 02/20/18 05:51





 02/20/18 05:51


Labs: 


 Laboratory Last Values











WBC  5.7 K/mm3 (4.5-11.0)   02/20/18  05:51    


 


RBC  5.15 M/mm3 (3.65-5.03)  H  02/20/18  05:51    


 


Hgb  14.3 gm/dl (11.8-15.2)   02/20/18  05:51    


 


Hct  42.7 % (35.5-45.6)   02/20/18  05:51    


 


MCV  83 fl (84-94)  L  02/20/18  05:51    


 


MCH  28 pg (28-32)   02/20/18  05:51    


 


MCHC  34 % (32-34)   02/20/18  05:51    


 


RDW  14.0 % (13.2-15.2)   02/20/18  05:51    


 


Plt Count  247 K/mm3 (140-440)   02/20/18  05:51    


 


Lymph % (Auto)  30.6 % (13.4-35.0)   02/20/18  05:51    


 


Mono % (Auto)  8.0 % (0.0-7.3)  H  02/20/18  05:51    


 


Eos % (Auto)  0.9 % (0.0-4.3)   02/20/18  05:51    


 


Baso % (Auto)  0.9 % (0.0-1.8)   02/20/18  05:51    


 


Lymph #  1.8 K/mm3 (1.2-5.4)   02/20/18  05:51    


 


Mono #  0.5 K/mm3 (0.0-0.8)   02/20/18  05:51    


 


Eos #  0.1 K/mm3 (0.0-0.4)   02/20/18  05:51    


 


Baso #  0.1 K/mm3 (0.0-0.1)   02/20/18  05:51    


 


Seg Neutrophils %  59.6 % (40.0-70.0)   02/20/18  05:51    


 


Seg Neutrophils #  3.4 K/mm3 (1.8-7.7)   02/20/18  05:51    


 


PT  12.9 Sec. (12.2-14.9)   02/19/18  09:43    


 


INR  0.93  (0.87-1.13)   02/19/18  09:43    


 


APTT  31.5 Sec. (24.2-36.6)   02/19/18  09:43    


 


Thrombin Time  16.6 Sec. (15.1-19.6)   02/19/18  09:43    


 


Sodium  134 mmol/L (137-145)  L  02/20/18  05:51    


 


Potassium  4.0 mmol/L (3.6-5.0)   02/20/18  05:51    


 


Chloride  98.3 mmol/L ()   02/20/18  05:51    


 


Carbon Dioxide  27 mmol/L (22-30)   02/20/18  05:51    


 


Anion Gap  13 mmol/L  02/20/18  05:51    


 


BUN  19 mg/dL (9-20)   02/20/18  05:51    


 


Creatinine  1.0 mg/dL (0.8-1.5)   02/20/18  05:51    


 


Estimated GFR  > 60 ml/min  02/20/18  05:51    


 


BUN/Creatinine Ratio  19 %  02/20/18  05:51    


 


Glucose  307 mg/dL ()  H  02/20/18  05:51    


 


POC Glucose  237  ()  H  02/20/18  12:05    


 


Hemoglobin A1c  11.0 % (4-6)  H  02/19/18  20:59    


 


Calcium  9.1 mg/dL (8.4-10.2)   02/20/18  05:51    


 


Total Bilirubin  0.20 mg/dL (0.1-1.2)   02/20/18  05:51    


 


AST  11 units/L (5-40)   02/20/18  05:51    


 


ALT  12 units/L (7-56)   02/20/18  05:51    


 


Alkaline Phosphatase  73 units/L ()   02/20/18  05:51    


 


Troponin T  < 0.010 ng/mL (0.00-0.029)   02/19/18  09:43    


 


Total Protein  6.6 g/dL (6.3-8.2)   02/20/18  05:51    


 


Albumin  3.7 g/dL (3.9-5)  L  02/20/18  05:51    


 


Albumin/Globulin Ratio  1.3 %  02/20/18  05:51    


 


Triglycerides  204 mg/dL (2-149)  H  02/20/18  05:51    


 


Cholesterol  247 mg/dL ()  H  02/20/18  05:51    


 


LDL Cholesterol Direct  159 mg/dL ()  H  02/20/18  05:51    


 


HDL Cholesterol  48 mg/dL (40-59)   02/20/18  05:51    


 


Cholesterol/HDL Ratio  5.14 %  02/20/18  05:51    


 


Urine Color  Straw  (Yellow)   02/19/18  11:19    


 


Urine Turbidity  Clear  (Clear)   02/19/18  11:19    


 


Urine pH  8.0  (5.0-7.0)  H  02/19/18  11:19    


 


Ur Specific Gravity  1.014  (1.003-1.030)   02/19/18  11:19    


 


Urine Protein  30 mg/dl mg/dL (Negative)   02/19/18  11:19    


 


Urine Glucose (UA)  >=500 mg/dL (Negative)   02/19/18  11:19    


 


Urine Ketones  Neg mg/dL (Negative)   02/19/18  11:19    


 


Urine Blood  Neg  (Negative)   02/19/18  11:19    


 


Urine Nitrite  Neg  (Negative)   02/19/18  11:19    


 


Urine Bilirubin  Neg  (Negative)   02/19/18  11:19    


 


Urine Urobilinogen  < 2.0 mg/dL (<2.0)   02/19/18  11:19    


 


Ur Leukocyte Esterase  Neg  (Negative)   02/19/18  11:19    


 


Urine WBC (Auto)  1.0 /HPF (0.0-6.0)   02/19/18  11:19    


 


Urine RBC (Auto)  1.0 /HPF (0.0-6.0)   02/19/18  11:19    


 


Urine Mucus  Few /HPF  02/19/18  11:19

## 2018-02-20 NOTE — MAGNETIC RESONANCE REPORT
MRA HEAD WITHOUT CONTRAST: 02/19/18 11:57:00



CLINICAL: Stroke.



TECHNIQUE: Axial 3-D time-of-flight MR angiography of the Eek of 

Rob with review of axial source images.



FINDINGS: Intact Eek of Rob with no aneurysm, high-grade stenosis 

or occlusion.  Symmetric blood flow in the anterior, middle and 

posterior cerebral arteries.  An anterior indicating artery is not 

demonstrated.  However, multiple large bilateral posterior 

communicating arteries are demonstrated.  Normal basilar and vertebral 

arteries.  The left vertebral artery is dominant.



IMPRESSION: Normal study.

## 2018-02-21 RX ADMIN — HEPARIN SODIUM SCH UNIT: 5000 INJECTION, SOLUTION INTRAVENOUS; SUBCUTANEOUS at 21:10

## 2018-02-21 RX ADMIN — HYDRALAZINE HYDROCHLORIDE SCH MG: 25 TABLET, FILM COATED ORAL at 06:40

## 2018-02-21 RX ADMIN — METFORMIN HYDROCHLORIDE SCH MG: 500 TABLET ORAL at 17:09

## 2018-02-21 RX ADMIN — LOSARTAN POTASSIUM SCH MG: 50 TABLET, FILM COATED ORAL at 11:41

## 2018-02-21 RX ADMIN — INSULIN ASPART SCH UNITS: 100 INJECTION, SOLUTION INTRAVENOUS; SUBCUTANEOUS at 12:24

## 2018-02-21 RX ADMIN — METFORMIN HYDROCHLORIDE SCH MG: 500 TABLET ORAL at 12:25

## 2018-02-21 RX ADMIN — FAMOTIDINE SCH MG: 20 TABLET ORAL at 12:25

## 2018-02-21 RX ADMIN — HEPARIN SODIUM SCH UNIT: 5000 INJECTION, SOLUTION INTRAVENOUS; SUBCUTANEOUS at 12:23

## 2018-02-21 RX ADMIN — INSULIN ASPART SCH UNITS: 100 INJECTION, SOLUTION INTRAVENOUS; SUBCUTANEOUS at 17:09

## 2018-02-21 RX ADMIN — HYDRALAZINE HYDROCHLORIDE SCH MG: 25 TABLET, FILM COATED ORAL at 14:55

## 2018-02-21 RX ADMIN — HYDROCHLOROTHIAZIDE SCH MG: 12.5 CAPSULE ORAL at 11:42

## 2018-02-21 RX ADMIN — SODIUM CHLORIDE SCH MLS/HR: 0.9 INJECTION, SOLUTION INTRAVENOUS at 11:44

## 2018-02-21 RX ADMIN — GLIMEPIRIDE SCH MG: 2 TABLET ORAL at 12:25

## 2018-02-21 RX ADMIN — HYDRALAZINE HYDROCHLORIDE SCH MG: 25 TABLET, FILM COATED ORAL at 21:09

## 2018-02-21 RX ADMIN — INSULIN ASPART SCH UNITS: 100 INJECTION, SOLUTION INTRAVENOUS; SUBCUTANEOUS at 21:13

## 2018-02-21 RX ADMIN — AMLODIPINE BESYLATE SCH MG: 10 TABLET ORAL at 11:42

## 2018-02-21 RX ADMIN — FAMOTIDINE SCH MG: 20 TABLET ORAL at 21:09

## 2018-02-21 RX ADMIN — INSULIN ASPART SCH: 100 INJECTION, SOLUTION INTRAVENOUS; SUBCUTANEOUS at 07:30

## 2018-02-21 RX ADMIN — CLOPIDOGREL BISULFATE SCH MG: 75 TABLET ORAL at 12:25

## 2018-02-21 NOTE — PROGRESS NOTE
Assessment and Plan





48 year old male awakened with left sided weakness on 2/19/18.  Subacute 

pontine infarcton noted on MRI.    Also diffuse white matter disease.  He is 

very young to have this level of vascular disease.  Started on aspirin and 

plavix.





Plan - Awaiting TTE.  Continue aspirin and plavix, statin.





Subjective


Date of service: 02/21/18


Principal diagnosis: Right pontine CVA


Interval history: 





48 year old male with diabetes, hypertension, hyperlipidemia presented with 

left weakness and slurred speech on 2/19/18.  Symptoms have resolved but a 

subacute rt. pontine infarct appeared on MRI scan.  MRA was unremarkable.





The patient is awaiting a TTE.





Plan - continue aspirin and statin coverage.





Objective





- Exam


Narrative Exam: 





Cranial nerve examination: PERRL, EOMI, VFF, V1/V2/V3 grossly intact, tongue 

midline, intact shoulder shrug, facial droop


Speech examination: intact, other (slight slurring)


Motor examination - right side: 5/5: biceps, triceps, wrist flexion, wrist 

extension, hip flexors, knee extensors, dorsiflexion, plantarflexion


Motor examination - left side: 4/5: biceps, triceps, wrist flexion, wrist 

extension, hip flexors, knee extensors, dorsiflexion, plantarflexion


Detailed sensory examination: intact, light touch, pain


Reflex and gait examination: other (slightly drags left leg)


Reflexes: 1+: ankle, bicep, knee


Cerebellar examination: other (FTN, KAVON, fine finger movements intact.)





- Vital Sign


 Vital Signs - 12hr











  02/21/18 02/21/18 02/21/18





  08:35 10:00 10:20


 


Temperature 98.0 F  


 


Pulse Rate 84 78 


 


Pulse Rate [  80 





Apical]   


 


Respiratory 18 20 





Rate   


 


Blood Pressure 152/101  


 


Blood Pressure   





[Left]   


 


O2 Sat by Pulse 98 96 97





Oximetry   














  02/21/18 02/21/18 02/21/18





  11:34 11:41 11:42


 


Temperature   


 


Pulse Rate 74 77 77


 


Pulse Rate [   





Apical]   


 


Respiratory   





Rate   


 


Blood Pressure 173/109 173/109 173/109


 


Blood Pressure   





[Left]   


 


O2 Sat by Pulse 98  





Oximetry   














  02/21/18 02/21/18





  14:55 18:16


 


Temperature  97.9 F


 


Pulse Rate 82 78


 


Pulse Rate [  





Apical]  


 


Respiratory  18





Rate  


 


Blood Pressure 173/104 


 


Blood Pressure  171/101





[Left]  


 


O2 Sat by Pulse  98





Oximetry  














- Laboratory Findings


CBC and BMP: 


 02/20/18 05:51





 02/20/18 05:51


Abnormal Lab Findings: 


 Abnormal Labs











  02/19/18 02/19/18 02/19/18





  09:24 09:43 09:43


 


RBC   5.62 H 


 


Hgb   15.5 H 


 


Hct   46.0 H 


 


MCV   82 L 


 


Mono % (Auto)   7.4 H 


 


Sodium    135 L


 


Chloride    95.5 L


 


Glucose    280 H


 


POC Glucose  250 H  


 


Hemoglobin A1c   


 


Albumin   


 


Triglycerides   


 


Cholesterol   


 


LDL Cholesterol Direct   


 


Urine pH   














  02/19/18 02/19/18 02/19/18





  10:42 11:19 20:04


 


RBC   


 


Hgb   


 


Hct   


 


MCV   


 


Mono % (Auto)   


 


Sodium   


 


Chloride   


 


Glucose   


 


POC Glucose  255 H   243 H


 


Hemoglobin A1c   


 


Albumin   


 


Triglycerides   


 


Cholesterol   


 


LDL Cholesterol Direct   


 


Urine pH   8.0 H 














  02/19/18 02/20/18 02/20/18





  20:59 05:51 05:51


 


RBC   5.15 H 


 


Hgb   


 


Hct   


 


MCV   83 L 


 


Mono % (Auto)   8.0 H 


 


Sodium    134 L


 


Chloride   


 


Glucose    307 H


 


POC Glucose   


 


Hemoglobin A1c  11.0 H  


 


Albumin    3.7 L


 


Triglycerides    204 H


 


Cholesterol    247 H


 


LDL Cholesterol Direct    159 H


 


Urine pH   














  02/20/18 02/20/18 02/20/18





  08:22 12:05 17:01


 


RBC   


 


Hgb   


 


Hct   


 


MCV   


 


Mono % (Auto)   


 


Sodium   


 


Chloride   


 


Glucose   


 


POC Glucose  277 H  237 H  178 H


 


Hemoglobin A1c   


 


Albumin   


 


Triglycerides   


 


Cholesterol   


 


LDL Cholesterol Direct   


 


Urine pH   














  02/20/18 02/21/18 02/21/18





  21:37 08:44 12:08


 


RBC   


 


Hgb   


 


Hct   


 


MCV   


 


Mono % (Auto)   


 


Sodium   


 


Chloride   


 


Glucose   


 


POC Glucose  237 H  180 H  180 H


 


Hemoglobin A1c   


 


Albumin   


 


Triglycerides   


 


Cholesterol   


 


LDL Cholesterol Direct   


 


Urine pH   














  02/21/18





  16:27


 


RBC 


 


Hgb 


 


Hct 


 


MCV 


 


Mono % (Auto) 


 


Sodium 


 


Chloride 


 


Glucose 


 


POC Glucose  156 H


 


Hemoglobin A1c 


 


Albumin 


 


Triglycerides 


 


Cholesterol 


 


LDL Cholesterol Direct 


 


Urine pH

## 2018-02-21 NOTE — PROGRESS NOTE
Assessment and Plan


Assessment and plan: 


--Acute right sided CVA; left sided weakness


Patient was not a candidate for TPA, continue aspirin and statin


Physical therapy occupational therapy rehabilitation


Supportive care, Follow echocardiogram/DARIO





--Type 2 diabetes mellitus; Accu-Chek sliding scale coverage and ADA diet and 

insulin as needed





--Hypertension; moderate control, permissive hypertension per stroke protocol


    When necessary hydralazine





--Dyslipidemia; placed on statin, low cholesterol diet





--DVT prophylaxis; Lovenox





neurology evaluation and recommendation noted and appreciated


Possible discharge in 1-2 days with outpatient physical therapy


Possible home health if needed


Plan of Care is reviewed with the patient and his wife at the bedside








History


Interval history: 


Patient seen and evaluated medical records reviewed


Patient continues to have left-sided weakness


MRI; subacute nonhemorrhagic right pontine lacunar infarct


Alert awake oriented 3


Vital signs reviewed


Family member at the bedside





Hospitalist Physical





- Constitutional


Vitals: 


 











Temp Pulse Resp BP Pulse Ox


 


 98.0 F   82   20   173/104   98 


 


 02/21/18 08:35  02/21/18 14:55  02/21/18 10:00  02/21/18 14:55  02/21/18 11:34











General appearance: Present: no acute distress, well-nourished





- EENT


Eyes: Present: PERRL, EOM intact





- Neck


Neck: Present: supple, normal ROM





- Respiratory


Respiratory effort: normal


Respiratory: bilateral: diminished, negative: rales, rhonchi, wheezing





- Cardiovascular


Rhythm: regular


Heart Sounds: Present: S1 & S2





- Extremities


Extremities: no ischemia, No edema





- Abdominal


General gastrointestinal: soft, non-tender, non-distended, normal bowel sounds





- Integumentary


Integumentary: Present: clear, warm





- Psychiatric


Psychiatric: appropriate mood/affect, cooperative





- Neurologic


Neurologic: CNII-XII intact, other (left-sided weakness )





Results





- Labs


CBC & Chem 7: 


 02/20/18 05:51





 02/20/18 05:51


Labs: 


 Laboratory Last Values











WBC  5.7 K/mm3 (4.5-11.0)   02/20/18  05:51    


 


RBC  5.15 M/mm3 (3.65-5.03)  H  02/20/18  05:51    


 


Hgb  14.3 gm/dl (11.8-15.2)   02/20/18  05:51    


 


Hct  42.7 % (35.5-45.6)   02/20/18  05:51    


 


MCV  83 fl (84-94)  L  02/20/18  05:51    


 


MCH  28 pg (28-32)   02/20/18  05:51    


 


MCHC  34 % (32-34)   02/20/18  05:51    


 


RDW  14.0 % (13.2-15.2)   02/20/18  05:51    


 


Plt Count  247 K/mm3 (140-440)   02/20/18  05:51    


 


Lymph % (Auto)  30.6 % (13.4-35.0)   02/20/18  05:51    


 


Mono % (Auto)  8.0 % (0.0-7.3)  H  02/20/18  05:51    


 


Eos % (Auto)  0.9 % (0.0-4.3)   02/20/18  05:51    


 


Baso % (Auto)  0.9 % (0.0-1.8)   02/20/18  05:51    


 


Lymph #  1.8 K/mm3 (1.2-5.4)   02/20/18  05:51    


 


Mono #  0.5 K/mm3 (0.0-0.8)   02/20/18  05:51    


 


Eos #  0.1 K/mm3 (0.0-0.4)   02/20/18  05:51    


 


Baso #  0.1 K/mm3 (0.0-0.1)   02/20/18  05:51    


 


Seg Neutrophils %  59.6 % (40.0-70.0)   02/20/18  05:51    


 


Seg Neutrophils #  3.4 K/mm3 (1.8-7.7)   02/20/18  05:51    


 


PT  12.9 Sec. (12.2-14.9)   02/19/18  09:43    


 


INR  0.93  (0.87-1.13)   02/19/18  09:43    


 


APTT  31.5 Sec. (24.2-36.6)   02/19/18  09:43    


 


Thrombin Time  16.6 Sec. (15.1-19.6)   02/19/18  09:43    


 


Sodium  134 mmol/L (137-145)  L  02/20/18  05:51    


 


Potassium  4.0 mmol/L (3.6-5.0)   02/20/18  05:51    


 


Chloride  98.3 mmol/L ()   02/20/18  05:51    


 


Carbon Dioxide  27 mmol/L (22-30)   02/20/18  05:51    


 


Anion Gap  13 mmol/L  02/20/18  05:51    


 


BUN  19 mg/dL (9-20)   02/20/18  05:51    


 


Creatinine  1.0 mg/dL (0.8-1.5)   02/20/18  05:51    


 


Estimated GFR  > 60 ml/min  02/20/18  05:51    


 


BUN/Creatinine Ratio  19 %  02/20/18  05:51    


 


Glucose  307 mg/dL ()  H  02/20/18  05:51    


 


POC Glucose  156  ()  H  02/21/18  16:27    


 


Hemoglobin A1c  11.0 % (4-6)  H  02/19/18  20:59    


 


Calcium  9.1 mg/dL (8.4-10.2)   02/20/18  05:51    


 


Total Bilirubin  0.20 mg/dL (0.1-1.2)   02/20/18  05:51    


 


AST  11 units/L (5-40)   02/20/18  05:51    


 


ALT  12 units/L (7-56)   02/20/18  05:51    


 


Alkaline Phosphatase  73 units/L ()   02/20/18  05:51    


 


Troponin T  < 0.010 ng/mL (0.00-0.029)   02/19/18  09:43    


 


Total Protein  6.6 g/dL (6.3-8.2)   02/20/18  05:51    


 


Albumin  3.7 g/dL (3.9-5)  L  02/20/18  05:51    


 


Albumin/Globulin Ratio  1.3 %  02/20/18  05:51    


 


Triglycerides  204 mg/dL (2-149)  H  02/20/18  05:51    


 


Cholesterol  247 mg/dL ()  H  02/20/18  05:51    


 


LDL Cholesterol Direct  159 mg/dL ()  H  02/20/18  05:51    


 


HDL Cholesterol  48 mg/dL (40-59)   02/20/18  05:51    


 


Cholesterol/HDL Ratio  5.14 %  02/20/18  05:51    


 


Vitamin B12  905.0 pg/mL (211-911)   02/20/18  15:11    


 


TSH  1.330 mlU/mL (0.270-4.200)   02/20/18  15:11    


 


Free T4  1.39 ng/dL (0.76-1.46)   02/20/18  15:11    


 


Urine Color  Straw  (Yellow)   02/19/18  11:19    


 


Urine Turbidity  Clear  (Clear)   02/19/18  11:19    


 


Urine pH  8.0  (5.0-7.0)  H  02/19/18  11:19    


 


Ur Specific Gravity  1.014  (1.003-1.030)   02/19/18  11:19    


 


Urine Protein  30 mg/dl mg/dL (Negative)   02/19/18  11:19    


 


Urine Glucose (UA)  >=500 mg/dL (Negative)   02/19/18  11:19    


 


Urine Ketones  Neg mg/dL (Negative)   02/19/18  11:19    


 


Urine Blood  Neg  (Negative)   02/19/18  11:19    


 


Urine Nitrite  Neg  (Negative)   02/19/18  11:19    


 


Urine Bilirubin  Neg  (Negative)   02/19/18  11:19    


 


Urine Urobilinogen  < 2.0 mg/dL (<2.0)   02/19/18  11:19    


 


Ur Leukocyte Esterase  Neg  (Negative)   02/19/18  11:19    


 


Urine WBC (Auto)  1.0 /HPF (0.0-6.0)   02/19/18  11:19    


 


Urine RBC (Auto)  1.0 /HPF (0.0-6.0)   02/19/18  11:19    


 


Urine Mucus  Few /HPF  02/19/18  11:19

## 2018-02-22 RX ADMIN — HYDRALAZINE HYDROCHLORIDE SCH MG: 25 TABLET, FILM COATED ORAL at 15:14

## 2018-02-22 RX ADMIN — INSULIN ASPART SCH UNITS: 100 INJECTION, SOLUTION INTRAVENOUS; SUBCUTANEOUS at 21:51

## 2018-02-22 RX ADMIN — INSULIN ASPART SCH UNITS: 100 INJECTION, SOLUTION INTRAVENOUS; SUBCUTANEOUS at 12:04

## 2018-02-22 RX ADMIN — LOSARTAN POTASSIUM SCH MG: 50 TABLET, FILM COATED ORAL at 09:07

## 2018-02-22 RX ADMIN — AMLODIPINE BESYLATE SCH MG: 10 TABLET ORAL at 09:08

## 2018-02-22 RX ADMIN — FAMOTIDINE SCH MG: 20 TABLET ORAL at 09:09

## 2018-02-22 RX ADMIN — SODIUM CHLORIDE SCH MLS/HR: 0.9 INJECTION, SOLUTION INTRAVENOUS at 12:18

## 2018-02-22 RX ADMIN — GLIMEPIRIDE SCH MG: 2 TABLET ORAL at 09:09

## 2018-02-22 RX ADMIN — FAMOTIDINE SCH MG: 20 TABLET ORAL at 21:47

## 2018-02-22 RX ADMIN — INSULIN ASPART SCH UNITS: 100 INJECTION, SOLUTION INTRAVENOUS; SUBCUTANEOUS at 09:06

## 2018-02-22 RX ADMIN — HEPARIN SODIUM SCH UNIT: 5000 INJECTION, SOLUTION INTRAVENOUS; SUBCUTANEOUS at 09:09

## 2018-02-22 RX ADMIN — INSULIN ASPART SCH UNITS: 100 INJECTION, SOLUTION INTRAVENOUS; SUBCUTANEOUS at 18:25

## 2018-02-22 RX ADMIN — HYDROCHLOROTHIAZIDE SCH MG: 12.5 CAPSULE ORAL at 09:09

## 2018-02-22 RX ADMIN — HYDRALAZINE HYDROCHLORIDE SCH MG: 25 TABLET, FILM COATED ORAL at 06:08

## 2018-02-22 RX ADMIN — HYDRALAZINE HYDROCHLORIDE SCH MG: 25 TABLET, FILM COATED ORAL at 21:47

## 2018-02-22 RX ADMIN — METFORMIN HYDROCHLORIDE SCH MG: 500 TABLET ORAL at 18:26

## 2018-02-22 RX ADMIN — HEPARIN SODIUM SCH UNIT: 5000 INJECTION, SOLUTION INTRAVENOUS; SUBCUTANEOUS at 21:50

## 2018-02-22 RX ADMIN — CLOPIDOGREL BISULFATE SCH MG: 75 TABLET ORAL at 09:08

## 2018-02-22 RX ADMIN — METFORMIN HYDROCHLORIDE SCH MG: 500 TABLET ORAL at 09:08

## 2018-02-22 NOTE — PROGRESS NOTE
Assessment and Plan


Assessment and plan: 


--Acute right pontine CVA; with left sided weakness


Patient was not a candidate for TPA, 


continue Plavix and statin


Physical therapy occupational therapy rehabilitation


Follow echocardiogram/[facility]ARIO, scheduled for tomorrow





--Type 2 diabetes mellitus; Accu-Chek sliding scale coverage and ADA diet and 

insulin as needed





--Hypertension; uncontrolled, and hydralazine, increase hydrochlorothiazide and 

losartan dose


Closely monitor blood pressures and adjust the medications as needed





--Dyslipidemia; placed on statin, low cholesterol diet





--Morbid obesity; BMI 41, counseling done ,advised diet modification and 

exercise as tolerated 


and weight reduction when medically stable





--DVT prophylaxis; Hy-Drive





neurology evaluation and recommendation noted and appreciated


Possible discharge in 1-2 days with outpatient physical therapy


Plan of Care is reviewed with the patient and his wife at the bedside





History


Interval history: 


Patient seen and evaluated medical records reviewed


No new events reported by the nursing staff


Left-sided weakness no change, tolerating physical therapy


PT recommend outpatient physical therapy


DARIO is pending, scheduled for tomorrow


Patient has no new complaints


Vital signs reviewed








Hospitalist Physical





- Constitutional


Vitals: 


 











Temp Pulse Resp BP Pulse Ox


 


 98.7 F   90   20   162/103   99 


 


 02/22/18 13:13  02/22/18 15:14  02/22/18 13:13  02/22/18 15:14  02/22/18 13:13











General appearance: Present: no acute distress, well-nourished





- EENT


Eyes: Present: PERRL, EOM intact





- Neck


Neck: Present: supple, normal ROM





- Respiratory


Respiratory effort: normal


Respiratory: negative: rales, rhonchi, wheezing





- Cardiovascular


Rhythm: regular


Heart Sounds: Present: S1 & S2





- Extremities


Extremities: no ischemia, No edema





- Abdominal


General gastrointestinal: soft, non-tender, non-distended, normal bowel sounds





- Integumentary


Integumentary: Present: clear, warm





- Psychiatric


Psychiatric: appropriate mood/affect, cooperative





- Neurologic


Neurologic: other (acute CVA with left-sided weakness)





Results





- Labs


CBC & Chem 7: 


 02/20/18 05:51





 02/20/18 05:51


Labs: 


 Laboratory Last Values











WBC  5.7 K/mm3 (4.5-11.0)   02/20/18  05:51    


 


RBC  5.15 M/mm3 (3.65-5.03)  H  02/20/18  05:51    


 


Hgb  14.3 gm/dl (11.8-15.2)   02/20/18  05:51    


 


Hct  42.7 % (35.5-45.6)   02/20/18  05:51    


 


MCV  83 fl (84-94)  L  02/20/18  05:51    


 


MCH  28 pg (28-32)   02/20/18  05:51    


 


MCHC  34 % (32-34)   02/20/18  05:51    


 


RDW  14.0 % (13.2-15.2)   02/20/18  05:51    


 


Plt Count  247 K/mm3 (140-440)   02/20/18  05:51    


 


Lymph % (Auto)  30.6 % (13.4-35.0)   02/20/18  05:51    


 


Mono % (Auto)  8.0 % (0.0-7.3)  H  02/20/18  05:51    


 


Eos % (Auto)  0.9 % (0.0-4.3)   02/20/18  05:51    


 


Baso % (Auto)  0.9 % (0.0-1.8)   02/20/18  05:51    


 


Lymph #  1.8 K/mm3 (1.2-5.4)   02/20/18  05:51    


 


Mono #  0.5 K/mm3 (0.0-0.8)   02/20/18  05:51    


 


Eos #  0.1 K/mm3 (0.0-0.4)   02/20/18  05:51    


 


Baso #  0.1 K/mm3 (0.0-0.1)   02/20/18  05:51    


 


Seg Neutrophils %  59.6 % (40.0-70.0)   02/20/18  05:51    


 


Seg Neutrophils #  3.4 K/mm3 (1.8-7.7)   02/20/18  05:51    


 


PT  12.9 Sec. (12.2-14.9)   02/19/18  09:43    


 


INR  0.93  (0.87-1.13)   02/19/18  09:43    


 


APTT  31.5 Sec. (24.2-36.6)   02/19/18  09:43    


 


Thrombin Time  16.6 Sec. (15.1-19.6)   02/19/18  09:43    


 


Sodium  134 mmol/L (137-145)  L  02/20/18  05:51    


 


Potassium  4.0 mmol/L (3.6-5.0)   02/20/18  05:51    


 


Chloride  98.3 mmol/L ()   02/20/18  05:51    


 


Carbon Dioxide  27 mmol/L (22-30)   02/20/18  05:51    


 


Anion Gap  13 mmol/L  02/20/18  05:51    


 


BUN  19 mg/dL (9-20)   02/20/18  05:51    


 


Creatinine  1.0 mg/dL (0.8-1.5)   02/20/18  05:51    


 


Estimated GFR  > 60 ml/min  02/20/18  05:51    


 


BUN/Creatinine Ratio  19 %  02/20/18  05:51    


 


Glucose  307 mg/dL ()  H  02/20/18  05:51    


 


POC Glucose  192  ()  H  02/22/18  08:25    


 


Hemoglobin A1c  11.0 % (4-6)  H  02/19/18  20:59    


 


Calcium  9.1 mg/dL (8.4-10.2)   02/20/18  05:51    


 


Total Bilirubin  0.20 mg/dL (0.1-1.2)   02/20/18  05:51    


 


AST  11 units/L (5-40)   02/20/18  05:51    


 


ALT  12 units/L (7-56)   02/20/18  05:51    


 


Alkaline Phosphatase  73 units/L ()   02/20/18  05:51    


 


Troponin T  < 0.010 ng/mL (0.00-0.029)   02/19/18  09:43    


 


Total Protein  6.6 g/dL (6.3-8.2)   02/20/18  05:51    


 


Albumin  3.7 g/dL (3.9-5)  L  02/20/18  05:51    


 


Albumin/Globulin Ratio  1.3 %  02/20/18  05:51    


 


Triglycerides  204 mg/dL (2-149)  H  02/20/18  05:51    


 


Cholesterol  247 mg/dL ()  H  02/20/18  05:51    


 


LDL Cholesterol Direct  159 mg/dL ()  H  02/20/18  05:51    


 


HDL Cholesterol  48 mg/dL (40-59)   02/20/18  05:51    


 


Cholesterol/HDL Ratio  5.14 %  02/20/18  05:51    


 


Vitamin B12  905.0 pg/mL (211-911)   02/20/18  15:11    


 


TSH  1.330 mlU/mL (0.270-4.200)   02/20/18  15:11    


 


Free T4  1.39 ng/dL (0.76-1.46)   02/20/18  15:11    


 


Urine Color  Straw  (Yellow)   02/19/18  11:19    


 


Urine Turbidity  Clear  (Clear)   02/19/18  11:19    


 


Urine pH  8.0  (5.0-7.0)  H  02/19/18  11:19    


 


Ur Specific Gravity  1.014  (1.003-1.030)   02/19/18  11:19    


 


Urine Protein  30 mg/dl mg/dL (Negative)   02/19/18  11:19    


 


Urine Glucose (UA)  >=500 mg/dL (Negative)   02/19/18  11:19    


 


Urine Ketones  Neg mg/dL (Negative)   02/19/18  11:19    


 


Urine Blood  Neg  (Negative)   02/19/18  11:19    


 


Urine Nitrite  Neg  (Negative)   02/19/18  11:19    


 


Urine Bilirubin  Neg  (Negative)   02/19/18  11:19    


 


Urine Urobilinogen  < 2.0 mg/dL (<2.0)   02/19/18  11:19    


 


Ur Leukocyte Esterase  Neg  (Negative)   02/19/18  11:19    


 


Urine WBC (Auto)  1.0 /HPF (0.0-6.0)   02/19/18  11:19    


 


Urine RBC (Auto)  1.0 /HPF (0.0-6.0)   02/19/18  11:19    


 


Urine Mucus  Few /HPF  02/19/18  11:19

## 2018-02-23 VITALS — SYSTOLIC BLOOD PRESSURE: 159 MMHG | DIASTOLIC BLOOD PRESSURE: 99 MMHG

## 2018-02-23 RX ADMIN — SODIUM CHLORIDE SCH MLS/HR: 0.9 INJECTION, SOLUTION INTRAVENOUS at 06:35

## 2018-02-23 RX ADMIN — HYDRALAZINE HYDROCHLORIDE SCH MG: 25 TABLET, FILM COATED ORAL at 06:35

## 2018-02-23 RX ADMIN — AMLODIPINE BESYLATE SCH MG: 10 TABLET ORAL at 12:08

## 2018-02-23 RX ADMIN — METFORMIN HYDROCHLORIDE SCH MG: 500 TABLET ORAL at 12:09

## 2018-02-23 RX ADMIN — INSULIN ASPART SCH UNITS: 100 INJECTION, SOLUTION INTRAVENOUS; SUBCUTANEOUS at 12:06

## 2018-02-23 RX ADMIN — GLIMEPIRIDE SCH MG: 2 TABLET ORAL at 12:09

## 2018-02-23 RX ADMIN — CLOPIDOGREL BISULFATE SCH MG: 75 TABLET ORAL at 12:09

## 2018-02-23 RX ADMIN — INSULIN ASPART SCH: 100 INJECTION, SOLUTION INTRAVENOUS; SUBCUTANEOUS at 11:05

## 2018-02-23 RX ADMIN — FAMOTIDINE SCH MG: 20 TABLET ORAL at 12:08

## 2018-02-23 RX ADMIN — HEPARIN SODIUM SCH UNIT: 5000 INJECTION, SOLUTION INTRAVENOUS; SUBCUTANEOUS at 12:07

## 2018-02-23 NOTE — DISCHARGE SUMMARY
Providers





- Providers


Date of Admission: 


02/19/18 11:57





Date of discharge: 02/23/18


Attending physician: 


AMY J KOCHERLA





 





02/19/18 20:41


Consult to Physician [CONS] Routine 


   Consulting Provider: ALTON CALI


   Reason For Exam: TIA


   Place consult to:: Dr. Cali


   Notified:: Cyndy SHERIFF


   Phone number called:: [phone]


   Was contact made?: Yes


   If yes, spoke with:: Voicemail with consult info left for stroke coordinator 

Suyapa Truong


   Time called:: 08:05





02/19/18 20:46


Occupational Therapy Evaluate and Treat [CONS] Routine 


   Comment: 


   Reason For Exam: Neuro deficits


Physical Therapy Evaluation and Treat [CONS] Routine 


   Comment: 


   Reason For Exam: Neuro deficits





02/19/18 20:48


Occupational Therapy Evaluate and Treat [CONS] Routine 


   Comment: 


   Reason For Exam: Neuro deficits


Physical Therapy Evaluation and Treat [CONS] Routine 


   Comment: 


   Reason For Exam: Neuro deficits





02/20/18 14:25


Speech Therapy Evaluation and Treat [CONS] Routine 


   Reason For Exam: swallowing evaluation.  pontine CVA.











Primary care physician: 


PRIMARY CARE MD








Hospitalization


Reason for admission: Left sided weakness


Condition: Stable


Pertinent studies: 


CT head without contrast; chronic white matter changes, chronic lacunar infarct 

in the right zak


Chest x-ray; no acute abnormality noted


DARIO; no source of thrombus, no shunt noted


MRI brain; subacute nonhemorrhagic right pontine neck no infarct, artery and 

bilateral chronic white matter microangiopathy


MRA brain; normal study


Carotid Doppler; less than 50% stenosis





Hospital course: 


Very pleasant morbidly obese 48-year-old male patient with significant past 

medical history of hypertension and type 2 diabetes mellitus 


and dyslipidemia was admitted through emergency room with left-sided weakness 

of one day duration;


Patient was not a candidate for TPA


Admitted to the hospital had extensive neuro evaluation


Noted to have right pontine stroke with left-sided hemiparesis


Stroke protocol was followed, Placed on aspirin and statin


Received physical therapy occupational therapy and speech therapy


Blood pressures was closely monitored and medications were optimized


Physical therapy recommended outpatient PT


Counseling done patient strongly advised diet modification and exercise as 

tolerated and weight reduction


Today's comfortable in bed no new complaints vital signs are stable


Left-sided weakness significantly improved


Alert awake oriented 3 not in acute distress


Vital signs reviewed stable


Face to face evaluation and physical examination done by me prior to discharge 

did not show any new changes


Patient is hemodynamically and clinically stable for discharge today


Advised to follow primary care physician and neurologist per schedule








Discharge Diagnosis:


--Acute right pontine CVA; with left sided weakness


--Type 2 diabetes mellitus; 


--Hypertension; 


--Dyslipidemia; 


--Morbid obesity; BMI 41,














Disposition: DC-01 TO HOME OR SELFCARE


Time spent for discharge: 32 min





Core Measure Documentation





- Palliative Care


Palliative Care/ Comfort Measures: Not Applicable





- Core Measures


Any of the following diagnoses?: stroke





- Stroke Discharge Requirements


Statin for LDL = or >70 mg/dl on DC: Yes


Anticoag for atrial fib/atrial flutter: No (No afib or flutter)


Reason for no anticoag for AF/F on DC: Not Indicated (no afib or flutter)


Antithrombotic for ischemic stroke: Yes





Exam





- Constitutional


Vitals: 


 











Temp Pulse Resp BP Pulse Ox


 


 98.2 F   83   20   163/98   96 


 


 02/23/18 11:15  02/23/18 11:24  02/23/18 11:15  02/23/18 11:15  02/23/18 11:24











General appearance: Present: no acute distress, well-nourished, obese





- EENT


Eyes: Present: PERRL, EOM intact





- Neck


Neck: Present: supple, normal ROM





- Respiratory


Respiratory effort: normal


Respiratory: negative: rales, rhonchi, wheezing





- Cardiovascular


Rhythm: regular


Heart Sounds: Present: S1 & S2





- Extremities


Extremities: no ischemia, No edema


Peripheral Pulses: within normal limits





- Abdominal


General gastrointestinal: Present: soft, non-tender, non-distended, normal 

bowel sounds





- Integumentary


Integumentary: Present: clear, warm





- Musculoskeletal


Musculoskeletal: left sided weakness





- Psychiatric


Psychiatric: appropriate mood/affect, cooperative





- Neurologic


Neurologic: CNII-XII intact, other (Left Sided weakness)





Plan


Activity: advance as tolerated, fall precautions


Diet: low cholesterol, diabetic


Special Instructions: physical therapy


Additional Instructions: Strongly advised to comply with medications and diet.  

Dietary modifications and exercise as tolerated and weight reduction advised 

when medically stable.  Outpatient physical therapy


Follow up with: 


MAHAMED HENSLEY MD [Staff Physician] - 7 Days


TRACY YADAV MD [Staff Physician] - 7 Days


PRIMARY CARE,MD [Primary Care Provider] - 3-5 Days


Prescriptions: 


amLODIPine [Norvasc] 10 mg PO DAILY #30 tablet


AtorvaSTATin [Lipitor] 40 mg PO QHS #30 tablet


Clopidogrel [Plavix] 75 mg PO QDAY #30 tablet


Hydralazine HCl 50 mg PO Q8H #90 tablet


Loratadine [Claritin] 10 mg PO QDAY #10 tablet


Losartan/Hydrochlorothiazide [Losartan-Hctz 100-25 mg Tab] 1 each PO DAILY #30 

tablet


Metoprolol [Lopressor TAB] 25 mg PO BID #60 tablet


oxyCODONE /ACETAMINOPHEN [Percocet 5/325 mg] 1 tab PO BID PRN #10 tablet


 PRN Reason: Pain, Moderate (4-6)


Other Discharge Orders: 


Physicial Therapy (Amb) Location: Determined By Patient

## 2019-06-22 ENCOUNTER — HOSPITAL ENCOUNTER (EMERGENCY)
Dept: HOSPITAL 5 - ED | Age: 49
Discharge: HOME | End: 2019-06-22
Payer: COMMERCIAL

## 2019-06-22 VITALS — DIASTOLIC BLOOD PRESSURE: 98 MMHG | SYSTOLIC BLOOD PRESSURE: 170 MMHG

## 2019-06-22 DIAGNOSIS — I10: ICD-10-CM

## 2019-06-22 DIAGNOSIS — S16.1XXA: Primary | ICD-10-CM

## 2019-06-22 DIAGNOSIS — J45.909: ICD-10-CM
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
## 2019-06-22 DIAGNOSIS — S39.012A: ICD-10-CM

## 2019-06-22 DIAGNOSIS — E11.9: ICD-10-CM
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
## 2019-06-22 DIAGNOSIS — Z91.013: ICD-10-CM
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
## 2019-06-22 PROCEDURE — 99282 EMERGENCY DEPT VISIT SF MDM: CPT
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
